# Patient Record
Sex: FEMALE | Race: WHITE | NOT HISPANIC OR LATINO
[De-identification: names, ages, dates, MRNs, and addresses within clinical notes are randomized per-mention and may not be internally consistent; named-entity substitution may affect disease eponyms.]

---

## 2018-01-02 ENCOUNTER — APPOINTMENT (OUTPATIENT)
Dept: OBGYN | Facility: CLINIC | Age: 53
End: 2018-01-02

## 2018-01-04 ENCOUNTER — APPOINTMENT (OUTPATIENT)
Dept: OBGYN | Facility: CLINIC | Age: 53
End: 2018-01-04

## 2018-03-15 ENCOUNTER — APPOINTMENT (OUTPATIENT)
Dept: OBGYN | Facility: CLINIC | Age: 53
End: 2018-03-15
Payer: COMMERCIAL

## 2018-03-15 VITALS
RESPIRATION RATE: 16 BRPM | DIASTOLIC BLOOD PRESSURE: 66 MMHG | WEIGHT: 126 LBS | HEIGHT: 66 IN | OXYGEN SATURATION: 99 % | HEART RATE: 85 BPM | SYSTOLIC BLOOD PRESSURE: 100 MMHG | BODY MASS INDEX: 20.25 KG/M2

## 2018-03-15 PROCEDURE — 99396 PREV VISIT EST AGE 40-64: CPT

## 2018-03-15 RX ORDER — DENOSUMAB 60 MG/ML
INJECTION SUBCUTANEOUS
Refills: 0 | Status: ACTIVE | COMMUNITY

## 2019-10-10 ENCOUNTER — APPOINTMENT (OUTPATIENT)
Dept: OBGYN | Facility: CLINIC | Age: 54
End: 2019-10-10
Payer: COMMERCIAL

## 2019-10-10 VITALS
BODY MASS INDEX: 20.09 KG/M2 | WEIGHT: 125 LBS | OXYGEN SATURATION: 98 % | DIASTOLIC BLOOD PRESSURE: 70 MMHG | HEART RATE: 83 BPM | HEIGHT: 66 IN | SYSTOLIC BLOOD PRESSURE: 110 MMHG

## 2019-10-10 PROCEDURE — 99396 PREV VISIT EST AGE 40-64: CPT

## 2019-10-10 NOTE — PHYSICAL EXAM
[Awake] : awake [Alert] : alert [Breast Reconstruction Right] : breast reconstruction [___] : a [unfilled] ~Ucm mastectomy scar [Soft] : soft [Breast Reconstruction Left] : breast reconstruction [Oriented x3] : oriented to person, place, and time [Normal] : urethra [No Bleeding] : there was no active vaginal bleeding [Absent] : absent [Adnexa Absent] : absent bilaterally [Acute Distress] : no acute distress [Tender] : non tender [Atrophy] : atrophy

## 2019-10-10 NOTE — HISTORY OF PRESENT ILLNESS
[HPV Vaccine NA Due to Age] : HPV vaccine not available to patient due to age [Postmenopausal] : is postmenopausal [Good] : being in good health [Sexually Active] : is not sexually active

## 2020-10-29 ENCOUNTER — APPOINTMENT (OUTPATIENT)
Dept: OBGYN | Facility: CLINIC | Age: 55
End: 2020-10-29
Payer: COMMERCIAL

## 2020-10-29 VITALS
BODY MASS INDEX: 20.25 KG/M2 | SYSTOLIC BLOOD PRESSURE: 110 MMHG | OXYGEN SATURATION: 98 % | WEIGHT: 126 LBS | DIASTOLIC BLOOD PRESSURE: 60 MMHG | HEIGHT: 66 IN | HEART RATE: 96 BPM | TEMPERATURE: 97.1 F

## 2020-10-29 PROCEDURE — 99072 ADDL SUPL MATRL&STAF TM PHE: CPT

## 2020-10-29 PROCEDURE — 99396 PREV VISIT EST AGE 40-64: CPT

## 2020-10-29 RX ORDER — ESCITALOPRAM OXALATE 5 MG/1
5 TABLET, FILM COATED ORAL
Refills: 0 | Status: ACTIVE | COMMUNITY

## 2020-10-29 NOTE — PHYSICAL EXAM
[Appropriately responsive] : appropriately responsive [Alert] : alert [No Acute Distress] : no acute distress [Soft] : soft [Non-tender] : non-tender [Non-distended] : non-distended [No HSM] : No HSM [No Lesions] : no lesions [No Mass] : no mass [Oriented x3] : oriented x3 [] : implants [Right Breast Absent] : a total mastectomy [Breast Reconstruction Right] : breast reconstruction [Left Breast Absent] : a total mastectomy [Breast Reconstruction Left] : breast reconstruction [Labia Majora] : normal [Labia Minora] : normal [Normal] : normal [Atrophy] : atrophy [Absent] : absent [Uterine Adnexae] : absent [[email or web address]] : vaginismus

## 2021-10-24 NOTE — PHYSICAL EXAM
[Appropriately responsive] : appropriately responsive [Alert] : alert [No Acute Distress] : no acute distress [Soft] : soft [Non-tender] : non-tender [Non-distended] : non-distended [No HSM] : No HSM [No Lesions] : no lesions [No Mass] : no mass [Oriented x3] : oriented x3 [] : implants [Right Breast Absent] : a total mastectomy [Breast Reconstruction Right] : breast reconstruction [Left Breast Absent] : a total mastectomy [Breast Reconstruction Left] : breast reconstruction [Labia Majora] : normal [Labia Minora] : normal [Normal] : normal [Atrophy] : atrophy [Absent] : absent [Uterine Adnexae] : absent

## 2021-10-29 ENCOUNTER — APPOINTMENT (OUTPATIENT)
Dept: OBGYN | Facility: CLINIC | Age: 56
End: 2021-10-29
Payer: COMMERCIAL

## 2021-10-29 VITALS — SYSTOLIC BLOOD PRESSURE: 90 MMHG | DIASTOLIC BLOOD PRESSURE: 62 MMHG | HEIGHT: 66 IN | HEART RATE: 94 BPM

## 2021-10-29 PROCEDURE — 99396 PREV VISIT EST AGE 40-64: CPT

## 2022-11-04 ENCOUNTER — APPOINTMENT (OUTPATIENT)
Dept: OBGYN | Facility: CLINIC | Age: 57
End: 2022-11-04

## 2022-11-04 VITALS
WEIGHT: 122 LBS | HEIGHT: 66 IN | BODY MASS INDEX: 19.61 KG/M2 | DIASTOLIC BLOOD PRESSURE: 71 MMHG | HEART RATE: 108 BPM | SYSTOLIC BLOOD PRESSURE: 106 MMHG

## 2022-11-04 PROCEDURE — 99396 PREV VISIT EST AGE 40-64: CPT

## 2022-11-04 RX ORDER — AZITHROMYCIN 250 MG/1
250 TABLET, FILM COATED ORAL
Qty: 6 | Refills: 0 | Status: ACTIVE | COMMUNITY
Start: 2022-06-20

## 2022-11-04 RX ORDER — ATOMOXETINE 40 MG/1
40 CAPSULE ORAL
Qty: 30 | Refills: 0 | Status: ACTIVE | COMMUNITY
Start: 2022-08-01

## 2022-11-04 RX ORDER — ALBUTEROL SULFATE 90 UG/1
108 (90 BASE) INHALANT RESPIRATORY (INHALATION)
Qty: 8 | Refills: 0 | Status: ACTIVE | COMMUNITY
Start: 2022-07-01

## 2022-11-04 RX ORDER — ATOMOXETINE 18 MG/1
18 CAPSULE ORAL
Qty: 30 | Refills: 0 | Status: ACTIVE | COMMUNITY
Start: 2022-05-19

## 2023-11-29 ENCOUNTER — APPOINTMENT (OUTPATIENT)
Dept: OBGYN | Facility: CLINIC | Age: 58
End: 2023-11-29
Payer: COMMERCIAL

## 2023-11-29 VITALS
DIASTOLIC BLOOD PRESSURE: 68 MMHG | SYSTOLIC BLOOD PRESSURE: 103 MMHG | WEIGHT: 117 LBS | BODY MASS INDEX: 18.8 KG/M2 | HEIGHT: 66 IN | HEART RATE: 91 BPM

## 2023-11-29 DIAGNOSIS — M62.89 OTHER SPECIFIED DISORDERS OF MUSCLE: ICD-10-CM

## 2023-11-29 DIAGNOSIS — Z01.419 ENCOUNTER FOR GYNECOLOGICAL EXAMINATION (GENERAL) (ROUTINE) W/OUT ABNORMAL FINDINGS: ICD-10-CM

## 2023-11-29 PROCEDURE — 99396 PREV VISIT EST AGE 40-64: CPT
